# Patient Record
(demographics unavailable — no encounter records)

---

## 2025-03-24 NOTE — CONSULT LETTER
[Dear  ___] : Dear  [unfilled], [FreeTextEntry1] : I had the pleasure of evaluating your patient, SUGEY DALTON. Please see my note enclosed for full details.   Thank you for allowing me to participate in the care of this patient. If you have any questions, please do not hesitate to contact me.   Sincerely,   Farideh Shah MD, FACOG, [facility]  [address] [place] [address] Phone: [phone]  Fax: [phone]

## 2025-03-24 NOTE — DISCUSSION/SUMMARY
[FreeTextEntry1] : 66 yo with pelvic pain/urinary symptoms after staring Trelegy.  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  -Also discussed possible interstitial cystitis although less likely -Vaginits plus panel to r/o infection -Given her symptoms are improving with time, I recommend continued expectant management, vulvar/vaginal heatlh measures reviewed -Given vaginal atrophy and dryness, discussed options of lubricant prn as well as vaginal moiksturizers and vaginal estrogen. Pt would like to try vaginal moisturizer, options provided.  -RTO prn  I spent 52 minutes of total time on the day of the encounter preparing for the visit, review of records, interacting with the patient, EHR documentation, and coordinating care.

## 2025-03-24 NOTE — HISTORY OF PRESENT ILLNESS
[FreeTextEntry1] : LMP: 10 years ago    Pt is a 66 yo female with no pmhx she presents to the office today referred by Dr. Pablo Parekh for pelvic pain, second opinion. Pt complains of pelvic pain going on for 6 months. As per patient, Dr. Regina Rasheed has seen her for this issue, and all exams were normal.   Took Trelegy 9/20 x 7 days for persistent URI symptoms, states on 9/27 started having urinary symptoms, difficult to describe, feels inflamed and irritated in vulvovaginal area. Denies hematuria. Seen by urology with negative workup.   States very intermittent gets worse when stress or tired, but overall is improving with less episodes.     Hx of vaginal dryness, has used Premarin once weekly, but it caused burning after she started Trelegy so she discontinued. Has recently resumed intercourse without issue.  Health maintenance: Lpap- 09/2024 normal  Lhpv- 09/2024 normal  Lmammo- 2 years ago - normal  Lcolonoscopy- 15 years ago - normal      POb Hx: G2 - 2NSVD  PGyn Hx:  Denies cysts/fibroids/abn pap/STI. SA, monogamous male.  PMH: NONE  PSH: TUBAL LIGATION 1996 , BUNIONECTOMY 5 YEARS AGO  Fam Hx: Denies colon/ ovarian / uterine / breast CA  Social Hx: No tob/ ETOH/ Drug  Medications: None  Allergies: Cipro - hives

## 2025-03-24 NOTE — HISTORY OF PRESENT ILLNESS
[FreeTextEntry1] : LMP: 10 years ago    Pt is a 64 yo female with no pmhx she presents to the office today referred by Dr. Pablo Parekh for pelvic pain, second opinion. Pt complains of pelvic pain going on for 6 months. As per patient, Dr. Regina Rasheed has seen her for this issue, and all exams were normal.   Took Trelegy 9/20 x 7 days for persistent URI symptoms, states on 9/27 started having urinary symptoms, difficult to describe, feels inflamed and irritated in vulvovaginal area. Denies hematuria. Seen by urology with negative workup.   States very intermittent gets worse when stress or tired, but overall is improving with less episodes.     Hx of vaginal dryness, has used Premarin once weekly, but it caused burning after she started Trelegy so she discontinued. Has recently resumed intercourse without issue.  Health maintenance: Lpap- 09/2024 normal  Lhpv- 09/2024 normal  Lmammo- 2 years ago - normal  Lcolonoscopy- 15 years ago - normal      POb Hx: G2 - 2NSVD  PGyn Hx:  Denies cysts/fibroids/abn pap/STI. SA, monogamous male.  PMH: NONE  PSH: TUBAL LIGATION 1996 , BUNIONECTOMY 5 YEARS AGO  Fam Hx: Denies colon/ ovarian / uterine / breast CA  Social Hx: No tob/ ETOH/ Drug  Medications: None  Allergies: Cipro - hives

## 2025-03-24 NOTE — DISCUSSION/SUMMARY
[FreeTextEntry1] : 64 yo with pelvic pain/urinary symptoms after staring Trelegy.  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  -Also discussed possible interstitial cystitis although less likely -Vaginits plus panel to r/o infection -Given her symptoms are improving with time, I recommend continued expectant management, vulvar/vaginal heatlh measures reviewed -Given vaginal atrophy and dryness, discussed options of lubricant prn as well as vaginal moiksturizers and vaginal estrogen. Pt would like to try vaginal moisturizer, options provided.  -RTO prn  I spent 52 minutes of total time on the day of the encounter preparing for the visit, review of records, interacting with the patient, EHR documentation, and coordinating care.

## 2025-03-24 NOTE — CONSULT LETTER
[Dear  ___] : Dear  [unfilled], [FreeTextEntry1] : I had the pleasure of evaluating your patient, SUGEY DALTON. Please see my note enclosed for full details.   Thank you for allowing me to participate in the care of this patient. If you have any questions, please do not hesitate to contact me.   Sincerely,   Farideh Shah MD, FACOG, [facility]  [address] [place] [address] Phone: [phone]  Fax: [phone]

## 2025-03-24 NOTE — PHYSICAL EXAM
[Chaperone Present] : A chaperone was present in the examining room during all aspects of the physical examination [Appropriately responsive] : appropriately responsive [Alert] : alert [No Acute Distress] : no acute distress [No Lymphadenopathy] : no lymphadenopathy [Soft] : soft [Non-tender] : non-tender [Non-distended] : non-distended [No HSM] : No HSM [No Lesions] : no lesions [No Mass] : no mass [Oriented x3] : oriented x3 [Labia Majora] : normal [Labia Minora] : normal [Atrophy] : atrophy [Normal] : normal [Uterine Adnexae] : normal [FreeTextEntry2] :  GENE Castellanos  [FreeTextEntry1] : Qtip testing negative [FreeTextEntry4] : small amount thin white/yellow discharge